# Patient Record
Sex: MALE | Race: WHITE | NOT HISPANIC OR LATINO | Employment: FULL TIME | URBAN - METROPOLITAN AREA
[De-identification: names, ages, dates, MRNs, and addresses within clinical notes are randomized per-mention and may not be internally consistent; named-entity substitution may affect disease eponyms.]

---

## 2023-08-07 PROBLEM — R11.0 MILD NAUSEA: Status: ACTIVE | Noted: 2023-08-07

## 2023-08-07 PROBLEM — E55.9 VITAMIN D DEFICIENCY: Status: ACTIVE | Noted: 2023-08-07

## 2023-08-07 PROBLEM — R50.9 FEVER: Status: RESOLVED | Noted: 2023-08-07 | Resolved: 2023-08-07

## 2023-08-07 PROBLEM — B34.9 VIRAL ILLNESS: Status: ACTIVE | Noted: 2023-08-07

## 2023-08-07 PROBLEM — H61.23 IMPACTED CERUMEN OF BOTH EARS: Status: ACTIVE | Noted: 2023-08-07

## 2023-08-07 PROBLEM — B07.9 VERRUCA: Status: ACTIVE | Noted: 2023-08-07

## 2023-08-07 PROBLEM — R22.0 MASS OF OCCIPITAL REGION: Status: ACTIVE | Noted: 2023-08-07

## 2023-08-07 PROBLEM — H57.10 EYE PAIN: Status: RESOLVED | Noted: 2023-08-07 | Resolved: 2023-08-07

## 2023-08-07 PROBLEM — R21 RASH: Status: ACTIVE | Noted: 2023-08-07

## 2023-08-07 PROBLEM — R51.9 HEADACHE: Status: ACTIVE | Noted: 2023-08-07

## 2023-08-07 PROBLEM — E66.9 OBESITY (BMI 30-39.9): Status: ACTIVE | Noted: 2023-08-07

## 2023-08-08 ENCOUNTER — OFFICE VISIT (OUTPATIENT)
Dept: PRIMARY CARE | Facility: CLINIC | Age: 23
End: 2023-08-08
Payer: COMMERCIAL

## 2023-08-08 VITALS
OXYGEN SATURATION: 97 % | WEIGHT: 315 LBS | SYSTOLIC BLOOD PRESSURE: 120 MMHG | DIASTOLIC BLOOD PRESSURE: 80 MMHG | TEMPERATURE: 98.4 F | BODY MASS INDEX: 40.43 KG/M2 | HEIGHT: 74 IN | HEART RATE: 110 BPM

## 2023-08-08 DIAGNOSIS — Z23 NEED FOR TETANUS BOOSTER: ICD-10-CM

## 2023-08-08 DIAGNOSIS — E66.01 MORBID OBESITY WITH BODY MASS INDEX (BMI) OF 45.0 TO 49.9 IN ADULT (MULTI): ICD-10-CM

## 2023-08-08 DIAGNOSIS — Z00.00 ENCOUNTER FOR ANNUAL PHYSICAL EXAM: Primary | ICD-10-CM

## 2023-08-08 DIAGNOSIS — G43.109 MIGRAINE WITH AURA AND WITHOUT STATUS MIGRAINOSUS, NOT INTRACTABLE: ICD-10-CM

## 2023-08-08 PROCEDURE — 1036F TOBACCO NON-USER: CPT | Performed by: INTERNAL MEDICINE

## 2023-08-08 PROCEDURE — 90471 IMMUNIZATION ADMIN: CPT | Performed by: INTERNAL MEDICINE

## 2023-08-08 PROCEDURE — 99395 PREV VISIT EST AGE 18-39: CPT | Performed by: INTERNAL MEDICINE

## 2023-08-08 PROCEDURE — 90715 TDAP VACCINE 7 YRS/> IM: CPT | Performed by: INTERNAL MEDICINE

## 2023-08-08 PROCEDURE — 3008F BODY MASS INDEX DOCD: CPT | Performed by: INTERNAL MEDICINE

## 2023-08-08 RX ORDER — SUMATRIPTAN 50 MG/1
50 TABLET, FILM COATED ORAL ONCE AS NEEDED
Qty: 27 TABLET | Refills: 3 | Status: SHIPPED | OUTPATIENT
Start: 2023-08-08 | End: 2024-08-07

## 2023-08-08 ASSESSMENT — ENCOUNTER SYMPTOMS
SINUS PAIN: 0
WHEEZING: 0
ARTHRALGIAS: 0
ABDOMINAL DISTENTION: 0
SHORTNESS OF BREATH: 0
NUMBNESS: 0
FATIGUE: 0
NECK STIFFNESS: 0
FLANK PAIN: 0
FEVER: 0
POLYPHAGIA: 0
CONFUSION: 0
MYALGIAS: 0
SINUS PRESSURE: 0
FREQUENCY: 0
DIZZINESS: 0
DYSPHORIC MOOD: 0
ABDOMINAL PAIN: 0
APPETITE CHANGE: 0
DYSURIA: 0
BRUISES/BLEEDS EASILY: 0
POLYDIPSIA: 0
HEADACHES: 1
BLOOD IN STOOL: 0
PHOTOPHOBIA: 0
SPEECH DIFFICULTY: 0
COUGH: 0
RHINORRHEA: 0
TROUBLE SWALLOWING: 0
CHEST TIGHTNESS: 0
HALLUCINATIONS: 0
BACK PAIN: 0
VOMITING: 0
VOICE CHANGE: 0
STRIDOR: 0
COLOR CHANGE: 0
CHOKING: 0
DIFFICULTY URINATING: 0
EYE REDNESS: 0
NERVOUS/ANXIOUS: 0
HEMATURIA: 0
PALPITATIONS: 0
NAUSEA: 0
JOINT SWELLING: 0
DIARRHEA: 0
TREMORS: 0
SEIZURES: 0
FACIAL ASYMMETRY: 0
CONSTIPATION: 0
WEAKNESS: 0
ACTIVITY CHANGE: 0
EYE DISCHARGE: 0

## 2023-08-08 ASSESSMENT — PATIENT HEALTH QUESTIONNAIRE - PHQ9
2. FEELING DOWN, DEPRESSED OR HOPELESS: NOT AT ALL
SUM OF ALL RESPONSES TO PHQ9 QUESTIONS 1 AND 2: 0
1. LITTLE INTEREST OR PLEASURE IN DOING THINGS: NOT AT ALL

## 2023-08-08 ASSESSMENT — PAIN SCALES - GENERAL: PAINLEVEL: 0-NO PAIN

## 2023-08-08 NOTE — LETTER
August 8, 2023     Patient: Gregg Singh   YOB: 2000   Date of Visit: 8/8/2023       To Whom It May Concern:    Gregg Singh was seen in my clinic on 8/8/2023 at 4:00 pm. Please excuse Gregg for his absence from work on this day to make the appointment due to migraines.    If you have any questions or concerns, please don't hesitate to call.         Sincerely,         Ryan Leal MD        CC: No Recipients

## 2023-08-08 NOTE — LETTER
Patient seen in office by Dr. Ryan Leal. Presented for migraines. Please excuse absence on 8/8/23.

## 2023-08-08 NOTE — PROGRESS NOTES
Subjective   Patient ID: Gregg Singh is a 23 y.o. male who presents for New Patient Visit (Establishing care, discuss migraines, requesting note for missing work).    HPI   Patient presented to the office for Migraine headache and Annual Wellness visit. He was seen by Dr Jaime Serna 2 years ago.    Migraine headache is present on and off few days a week and sometimes its so bad that he is not able to go to work. Patient denied for prophylaxis medicine and at this time he just want to continue medicine as needed for acute attack.    Review of Systems   Constitutional:  Negative for activity change, appetite change, fatigue and fever.   HENT:  Negative for congestion, dental problem, ear pain, hearing loss, rhinorrhea, sinus pressure, sinus pain, trouble swallowing and voice change.    Eyes:  Negative for photophobia, discharge, redness and visual disturbance.   Respiratory:  Negative for cough, choking, chest tightness, shortness of breath, wheezing and stridor.    Cardiovascular:  Negative for chest pain, palpitations and leg swelling.   Gastrointestinal:  Negative for abdominal distention, abdominal pain, blood in stool, constipation, diarrhea, nausea and vomiting.   Endocrine: Negative for polydipsia, polyphagia and polyuria.   Genitourinary:  Negative for difficulty urinating, dysuria, flank pain, frequency, hematuria and urgency.   Musculoskeletal:  Negative for arthralgias, back pain, gait problem, joint swelling, myalgias and neck stiffness.   Skin:  Negative for color change and rash.   Allergic/Immunologic: Negative for immunocompromised state.   Neurological:  Positive for headaches. Negative for dizziness, tremors, seizures, syncope, facial asymmetry, speech difficulty, weakness and numbness.   Hematological:  Does not bruise/bleed easily.   Psychiatric/Behavioral:  Negative for behavioral problems, confusion, dysphoric mood, hallucinations and suicidal ideas. The patient is not nervous/anxious.   "    Objective   /80   Pulse 110   Temp 36.9 °C (98.4 °F)   Ht 1.88 m (6' 2\")   Wt (!) 162 kg (357 lb)   SpO2 97%   BMI 45.84 kg/m²     Physical Exam  Constitutional:       General: He is not in acute distress.     Appearance: Normal appearance. He is obese. He is not ill-appearing or toxic-appearing.   HENT:      Head: Normocephalic and atraumatic.      Nose: Nose normal. No congestion or rhinorrhea.      Mouth/Throat:      Mouth: Mucous membranes are moist.   Eyes:      General: No scleral icterus.     Extraocular Movements: Extraocular movements intact.      Conjunctiva/sclera: Conjunctivae normal.      Pupils: Pupils are equal, round, and reactive to light.   Cardiovascular:      Rate and Rhythm: Normal rate and regular rhythm.      Pulses: Normal pulses.      Heart sounds: Normal heart sounds. No murmur heard.     No gallop.   Pulmonary:      Effort: Pulmonary effort is normal. No respiratory distress.      Breath sounds: Normal breath sounds. No stridor. No wheezing, rhonchi or rales.   Abdominal:      General: Abdomen is flat. Bowel sounds are normal.      Palpations: Abdomen is soft.      Tenderness: There is no abdominal tenderness. There is no guarding or rebound.   Musculoskeletal:         General: No swelling or deformity. Normal range of motion.      Cervical back: Normal range of motion and neck supple. No rigidity or tenderness.      Right lower leg: No edema.      Left lower leg: No edema.   Lymphadenopathy:      Cervical: No cervical adenopathy.   Skin:     General: Skin is warm.      Coloration: Skin is not jaundiced.      Findings: No erythema or lesion.   Neurological:      General: No focal deficit present.      Mental Status: He is alert and oriented to person, place, and time.      Cranial Nerves: No cranial nerve deficit.      Motor: No weakness.      Coordination: Coordination normal.      Gait: Gait normal.   Psychiatric:         Mood and Affect: Mood normal.         Behavior: " Behavior normal.         Thought Content: Thought content normal.         Judgment: Judgment normal.       Assessment/Plan   Problem List Items Addressed This Visit          Endocrine/Metabolic    Morbid obesity with body mass index (BMI) of 45.0 to 49.9 in adult (CMS/[facility])     - Lifestyle modification which include daily exercise at least for 45 minute and Low Calorie intake of 1800- 2000 Kcal per day.          Other Visit Diagnoses       Encounter for annual physical exam    -  Primary    Relevant Orders    CBC and Auto Differential    Comprehensive Metabolic Panel    Lipid Panel    PSA, Total and Free    TSH with reflex to Free T4 if abnormal    Vitamin D, Total    Hepatitis C Antibody    HIV 1/2 Antigen/Antibody Screen with Reflex to Confirmation    Migraine with aura and without status migrainosus, not intractable        Relevant Medications    SUMAtriptan (Imitrex) 50 mg tablet    Need for tetanus booster        Relevant Orders    Tdap vaccine, age 10 years and older ([facility]) (Completed)

## 2023-09-06 ENCOUNTER — LAB (OUTPATIENT)
Dept: LAB | Facility: LAB | Age: 23
End: 2023-09-06
Payer: COMMERCIAL

## 2023-09-06 DIAGNOSIS — Z00.00 ENCOUNTER FOR ANNUAL PHYSICAL EXAM: ICD-10-CM

## 2023-09-06 LAB
ALANINE AMINOTRANSFERASE (SGPT) (U/L) IN SER/PLAS: 76 U/L (ref 10–52)
ALBUMIN (G/DL) IN SER/PLAS: 4.6 G/DL (ref 3.4–5)
ALKALINE PHOSPHATASE (U/L) IN SER/PLAS: 79 U/L (ref 33–120)
ANION GAP IN SER/PLAS: 14 MMOL/L (ref 10–20)
ASPARTATE AMINOTRANSFERASE (SGOT) (U/L) IN SER/PLAS: 57 U/L (ref 9–39)
BASOPHILS (10*3/UL) IN BLOOD BY AUTOMATED COUNT: 0.05 X10E9/L (ref 0–0.1)
BASOPHILS/100 LEUKOCYTES IN BLOOD BY AUTOMATED COUNT: 0.7 % (ref 0–2)
BILIRUBIN TOTAL (MG/DL) IN SER/PLAS: 0.9 MG/DL (ref 0–1.2)
CALCIUM (MG/DL) IN SER/PLAS: 9.5 MG/DL (ref 8.6–10.3)
CARBON DIOXIDE, TOTAL (MMOL/L) IN SER/PLAS: 29 MMOL/L (ref 21–32)
CHLORIDE (MMOL/L) IN SER/PLAS: 99 MMOL/L (ref 98–107)
CHOLESTEROL (MG/DL) IN SER/PLAS: 175 MG/DL (ref 0–199)
CHOLESTEROL IN HDL (MG/DL) IN SER/PLAS: 33.7 MG/DL
CHOLESTEROL/HDL RATIO: 5.2
CREATININE (MG/DL) IN SER/PLAS: 1.04 MG/DL (ref 0.5–1.3)
EOSINOPHILS (10*3/UL) IN BLOOD BY AUTOMATED COUNT: 0.04 X10E9/L (ref 0–0.7)
EOSINOPHILS/100 LEUKOCYTES IN BLOOD BY AUTOMATED COUNT: 0.6 % (ref 0–6)
ERYTHROCYTE DISTRIBUTION WIDTH (RATIO) BY AUTOMATED COUNT: 12.4 % (ref 11.5–14.5)
ERYTHROCYTE MEAN CORPUSCULAR HEMOGLOBIN CONCENTRATION (G/DL) BY AUTOMATED: 31.8 G/DL (ref 32–36)
ERYTHROCYTE MEAN CORPUSCULAR VOLUME (FL) BY AUTOMATED COUNT: 93 FL (ref 80–100)
ERYTHROCYTES (10*6/UL) IN BLOOD BY AUTOMATED COUNT: 4.98 X10E12/L (ref 4.5–5.9)
GFR MALE: >90 ML/MIN/1.73M2
GLUCOSE (MG/DL) IN SER/PLAS: 74 MG/DL (ref 74–99)
HEMATOCRIT (%) IN BLOOD BY AUTOMATED COUNT: 46.5 % (ref 41–52)
HEMOGLOBIN (G/DL) IN BLOOD: 14.8 G/DL (ref 13.5–17.5)
IMMATURE GRANULOCYTES/100 LEUKOCYTES IN BLOOD BY AUTOMATED COUNT: 0.4 % (ref 0–0.9)
LDL: 115 MG/DL (ref 0–119)
LEUKOCYTES (10*3/UL) IN BLOOD BY AUTOMATED COUNT: 6.8 X10E9/L (ref 4.4–11.3)
LYMPHOCYTES (10*3/UL) IN BLOOD BY AUTOMATED COUNT: 1.83 X10E9/L (ref 1.2–4.8)
LYMPHOCYTES/100 LEUKOCYTES IN BLOOD BY AUTOMATED COUNT: 26.8 % (ref 13–44)
MONOCYTES (10*3/UL) IN BLOOD BY AUTOMATED COUNT: 0.49 X10E9/L (ref 0.1–1)
MONOCYTES/100 LEUKOCYTES IN BLOOD BY AUTOMATED COUNT: 7.2 % (ref 2–10)
NEUTROPHILS (10*3/UL) IN BLOOD BY AUTOMATED COUNT: 4.39 X10E9/L (ref 1.2–7.7)
NEUTROPHILS/100 LEUKOCYTES IN BLOOD BY AUTOMATED COUNT: 64.3 % (ref 40–80)
PLATELETS (10*3/UL) IN BLOOD AUTOMATED COUNT: 124 X10E9/L (ref 150–450)
POTASSIUM (MMOL/L) IN SER/PLAS: 4 MMOL/L (ref 3.5–5.3)
PROTEIN TOTAL: 7.6 G/DL (ref 6.4–8.2)
SODIUM (MMOL/L) IN SER/PLAS: 138 MMOL/L (ref 136–145)
THYROTROPIN (MIU/L) IN SER/PLAS BY DETECTION LIMIT <= 0.05 MIU/L: 2.47 MIU/L (ref 0.44–3.98)
TRIGLYCERIDE (MG/DL) IN SER/PLAS: 132 MG/DL (ref 0–149)
UREA NITROGEN (MG/DL) IN SER/PLAS: 12 MG/DL (ref 6–23)
VLDL: 26 MG/DL (ref 0–40)

## 2023-09-06 PROCEDURE — 80053 COMPREHEN METABOLIC PANEL: CPT

## 2023-09-06 PROCEDURE — 84443 ASSAY THYROID STIM HORMONE: CPT

## 2023-09-06 PROCEDURE — 36415 COLL VENOUS BLD VENIPUNCTURE: CPT

## 2023-09-06 PROCEDURE — 80061 LIPID PANEL: CPT

## 2023-09-06 PROCEDURE — 85025 COMPLETE CBC W/AUTO DIFF WBC: CPT

## 2023-09-06 PROCEDURE — 82306 VITAMIN D 25 HYDROXY: CPT

## 2023-09-06 PROCEDURE — 87389 HIV-1 AG W/HIV-1&-2 AB AG IA: CPT

## 2023-09-06 PROCEDURE — 84153 ASSAY OF PSA TOTAL: CPT

## 2023-09-06 PROCEDURE — 86803 HEPATITIS C AB TEST: CPT

## 2023-09-06 PROCEDURE — 84154 ASSAY OF PSA FREE: CPT

## 2023-09-07 LAB
CALCIDIOL (25 OH VITAMIN D3) (NG/ML) IN SER/PLAS: 9 NG/ML
HEPATITIS C VIRUS AB PRESENCE IN SERUM: NONREACTIVE
HIV 1/ 2 AG/AB SCREEN: NONREACTIVE

## 2023-09-09 LAB
PROSTATE SPECIFIC AG (NG/ML) IN SER/PLAS: 0.5 NG/ML (ref 0–4)
PROSTATE SPECIFIC AG FREE (NG/ML) IN SER/PLAS: 0.2 NG/ML
PROSTATE SPECIFIC AG FREE/PROSTATE SPECIFIC AG TOTAL IN SER/PLAS: 40 %

## 2023-09-28 ENCOUNTER — OFFICE VISIT (OUTPATIENT)
Dept: PRIMARY CARE | Facility: CLINIC | Age: 23
End: 2023-09-28
Payer: COMMERCIAL

## 2023-09-28 VITALS
OXYGEN SATURATION: 98 % | TEMPERATURE: 97.2 F | DIASTOLIC BLOOD PRESSURE: 80 MMHG | SYSTOLIC BLOOD PRESSURE: 124 MMHG | BODY MASS INDEX: 47.25 KG/M2 | WEIGHT: 315 LBS | HEART RATE: 93 BPM

## 2023-09-28 DIAGNOSIS — E66.01 MORBID OBESITY WITH BODY MASS INDEX (BMI) OF 45.0 TO 49.9 IN ADULT (MULTI): ICD-10-CM

## 2023-09-28 DIAGNOSIS — R79.89 LOW TESTOSTERONE: ICD-10-CM

## 2023-09-28 DIAGNOSIS — E55.9 VITAMIN D DEFICIENCY: Primary | ICD-10-CM

## 2023-09-28 PROBLEM — B34.9 VIRAL ILLNESS: Status: RESOLVED | Noted: 2023-08-07 | Resolved: 2023-09-28

## 2023-09-28 PROCEDURE — 99213 OFFICE O/P EST LOW 20 MIN: CPT | Performed by: INTERNAL MEDICINE

## 2023-09-28 PROCEDURE — 3008F BODY MASS INDEX DOCD: CPT | Performed by: INTERNAL MEDICINE

## 2023-09-28 PROCEDURE — 1036F TOBACCO NON-USER: CPT | Performed by: INTERNAL MEDICINE

## 2023-09-28 RX ORDER — ERGOCALCIFEROL 1.25 MG/1
50000 CAPSULE ORAL
Qty: 12 CAPSULE | Refills: 0 | Status: SHIPPED | OUTPATIENT
Start: 2023-09-28 | End: 2023-11-30 | Stop reason: SDUPTHER

## 2023-09-28 ASSESSMENT — ENCOUNTER SYMPTOMS
DYSURIA: 0
EYE REDNESS: 0
VOICE CHANGE: 0
ACTIVITY CHANGE: 0
CHEST TIGHTNESS: 0
CHOKING: 0
FLANK PAIN: 0
SEIZURES: 0
VOMITING: 0
NAUSEA: 0
SPEECH DIFFICULTY: 0
RHINORRHEA: 0
HALLUCINATIONS: 0
CONSTIPATION: 0
STRIDOR: 0
BACK PAIN: 0
PALPITATIONS: 0
POLYPHAGIA: 0
TROUBLE SWALLOWING: 0
PHOTOPHOBIA: 0
DYSPHORIC MOOD: 0
COLOR CHANGE: 0
NUMBNESS: 0
MYALGIAS: 0
SHORTNESS OF BREATH: 0
FEVER: 0
EYE DISCHARGE: 0
WEAKNESS: 0
CONFUSION: 0
NECK STIFFNESS: 0
POLYDIPSIA: 0
APPETITE CHANGE: 0
SINUS PRESSURE: 0
ABDOMINAL PAIN: 0
COUGH: 0
BLOOD IN STOOL: 0
SINUS PAIN: 0
HEADACHES: 0
FACIAL ASYMMETRY: 0
ARTHRALGIAS: 0
WHEEZING: 0
FATIGUE: 1
DIZZINESS: 0
DIFFICULTY URINATING: 0
ABDOMINAL DISTENTION: 0
FREQUENCY: 0
NERVOUS/ANXIOUS: 0
BRUISES/BLEEDS EASILY: 0
DIARRHEA: 0

## 2023-09-28 ASSESSMENT — PATIENT HEALTH QUESTIONNAIRE - PHQ9
SUM OF ALL RESPONSES TO PHQ9 QUESTIONS 1 AND 2: 0
1. LITTLE INTEREST OR PLEASURE IN DOING THINGS: NOT AT ALL
2. FEELING DOWN, DEPRESSED OR HOPELESS: NOT AT ALL

## 2023-09-28 NOTE — PROGRESS NOTES
Subjective   Patient ID: Gregg Singh is a 23 y.o. male who presents for Fatigue.    HPI   Patient presented to the office for the symptoms of fatigue. His Vitamin D level is 9 and 5 years ago it was 11. Patient is only on MVI but clearly its not working. Vitamin D level need to be replaced.    Patient snore but denied for waking up in night. His STOP BANG score is 5 and is at moderate to high risk for AUDREY.    Review of Systems   Constitutional:  Positive for fatigue. Negative for activity change, appetite change and fever.   HENT:  Negative for congestion, dental problem, ear pain, hearing loss, rhinorrhea, sinus pressure, sinus pain, trouble swallowing and voice change.    Eyes:  Negative for photophobia, discharge, redness and visual disturbance.   Respiratory:  Negative for cough, choking, chest tightness, shortness of breath, wheezing and stridor.    Cardiovascular:  Negative for chest pain, palpitations and leg swelling.   Gastrointestinal:  Negative for abdominal distention, abdominal pain, blood in stool, constipation, diarrhea, nausea and vomiting.   Endocrine: Negative for polydipsia, polyphagia and polyuria.   Genitourinary:  Negative for difficulty urinating, dysuria, flank pain, frequency and urgency.   Musculoskeletal:  Negative for arthralgias, back pain, myalgias and neck stiffness.   Skin:  Negative for color change and rash.   Allergic/Immunologic: Negative for immunocompromised state.   Neurological:  Negative for dizziness, seizures, syncope, facial asymmetry, speech difficulty, weakness, numbness and headaches.   Hematological:  Does not bruise/bleed easily.   Psychiatric/Behavioral:  Negative for behavioral problems, confusion, dysphoric mood, hallucinations and suicidal ideas. The patient is not nervous/anxious.      Objective   /80   Pulse 93   Temp 36.2 °C (97.2 °F)   Wt (!) 167 kg (368 lb)   SpO2 98%   BMI 47.25 kg/m²     Physical Exam  Vitals and nursing note reviewed.    Constitutional:       General: He is not in acute distress.     Appearance: Normal appearance. He is obese. He is not ill-appearing or toxic-appearing.   HENT:      Head: Normocephalic and atraumatic.      Nose: Nose normal. No congestion or rhinorrhea.      Mouth/Throat:      Mouth: Mucous membranes are moist.   Eyes:      General: No scleral icterus.     Extraocular Movements: Extraocular movements intact.      Conjunctiva/sclera: Conjunctivae normal.      Pupils: Pupils are equal, round, and reactive to light.   Cardiovascular:      Rate and Rhythm: Normal rate and regular rhythm.      Pulses: Normal pulses.      Heart sounds: Normal heart sounds. No murmur heard.     No gallop.   Pulmonary:      Effort: Pulmonary effort is normal. No respiratory distress.      Breath sounds: Normal breath sounds. No stridor. No wheezing, rhonchi or rales.   Musculoskeletal:         General: No deformity or signs of injury. Normal range of motion.      Cervical back: Normal range of motion.   Skin:     General: Skin is warm.      Coloration: Skin is not jaundiced.      Findings: No erythema or lesion.   Neurological:      General: No focal deficit present.      Mental Status: He is alert and oriented to person, place, and time.      Cranial Nerves: No cranial nerve deficit.      Motor: No weakness.      Coordination: Coordination normal.      Gait: Gait normal.   Psychiatric:         Mood and Affect: Mood normal.         Behavior: Behavior normal.         Thought Content: Thought content normal.         Judgment: Judgment normal.       Assessment/Plan   Problem List Items Addressed This Visit          Endocrine/Metabolic    Vitamin D deficiency - Primary    Relevant Medications    ergocalciferol (Vitamin D-2) 1.25 MG (29536 UT) capsule    Other Relevant Orders    Vitamin D 25-Hydroxy,Total (for eval of Vitamin D levels)    Morbid obesity with body mass index (BMI) of 45.0 to 49.9 in adult (CMS/[facility])     - Lifestyle modification  which include daily exercise at least for 45 minute and Low Calorie intake of 1800- 2000 Kcal per day.          Other Visit Diagnoses       Low testosterone        Relevant Orders    Testosterone,Free and Total

## 2023-11-29 ENCOUNTER — LAB (OUTPATIENT)
Dept: LAB | Facility: LAB | Age: 23
End: 2023-11-29
Payer: COMMERCIAL

## 2023-11-29 DIAGNOSIS — E55.9 VITAMIN D DEFICIENCY: ICD-10-CM

## 2023-11-29 DIAGNOSIS — R79.89 LOW TESTOSTERONE: ICD-10-CM

## 2023-11-29 PROCEDURE — 82306 VITAMIN D 25 HYDROXY: CPT

## 2023-11-29 PROCEDURE — 36415 COLL VENOUS BLD VENIPUNCTURE: CPT

## 2023-11-29 PROCEDURE — 84402 ASSAY OF FREE TESTOSTERONE: CPT

## 2023-11-30 ENCOUNTER — OFFICE VISIT (OUTPATIENT)
Dept: PRIMARY CARE | Facility: CLINIC | Age: 23
End: 2023-11-30
Payer: COMMERCIAL

## 2023-11-30 VITALS
DIASTOLIC BLOOD PRESSURE: 90 MMHG | BODY MASS INDEX: 39.17 KG/M2 | SYSTOLIC BLOOD PRESSURE: 130 MMHG | HEIGHT: 75 IN | TEMPERATURE: 97.2 F | WEIGHT: 315 LBS | OXYGEN SATURATION: 97 % | HEART RATE: 67 BPM

## 2023-11-30 DIAGNOSIS — E66.01 MORBID OBESITY WITH BODY MASS INDEX (BMI) OF 45.0 TO 49.9 IN ADULT (MULTI): ICD-10-CM

## 2023-11-30 DIAGNOSIS — E55.9 VITAMIN D DEFICIENCY: Primary | ICD-10-CM

## 2023-11-30 PROBLEM — E66.9 OBESITY WITH BODY MASS INDEX 30 OR GREATER: Status: ACTIVE | Noted: 2023-11-30

## 2023-11-30 PROBLEM — B07.9 VIRAL WART, UNSPECIFIED: Status: ACTIVE | Noted: 2018-05-09

## 2023-11-30 PROBLEM — R52 PAIN, UNSPECIFIED: Status: ACTIVE | Noted: 2018-05-09

## 2023-11-30 PROBLEM — L85.8 OTHER SPECIFIED EPIDERMAL THICKENING: Status: ACTIVE | Noted: 2018-05-09

## 2023-11-30 LAB — 25(OH)D3 SERPL-MCNC: 19 NG/ML (ref 30–100)

## 2023-11-30 PROCEDURE — 99213 OFFICE O/P EST LOW 20 MIN: CPT | Performed by: INTERNAL MEDICINE

## 2023-11-30 PROCEDURE — 1036F TOBACCO NON-USER: CPT | Performed by: INTERNAL MEDICINE

## 2023-11-30 PROCEDURE — 3008F BODY MASS INDEX DOCD: CPT | Performed by: INTERNAL MEDICINE

## 2023-11-30 RX ORDER — ERGOCALCIFEROL 1.25 MG/1
50000 CAPSULE ORAL
Qty: 12 CAPSULE | Refills: 3 | Status: SHIPPED | OUTPATIENT
Start: 2023-11-30 | End: 2024-10-31

## 2023-11-30 ASSESSMENT — ENCOUNTER SYMPTOMS
HEMATURIA: 0
DYSPHORIC MOOD: 0
COUGH: 0
HALLUCINATIONS: 0
DYSURIA: 0
CHEST TIGHTNESS: 0
SINUS PAIN: 0
NUMBNESS: 0
VOICE CHANGE: 0
STRIDOR: 0
DECREASED CONCENTRATION: 0
POLYPHAGIA: 0
LIGHT-HEADEDNESS: 0
ARTHRALGIAS: 0
DIFFICULTY URINATING: 0
TREMORS: 0
SPEECH DIFFICULTY: 0
FREQUENCY: 0
TROUBLE SWALLOWING: 0
POLYDIPSIA: 0
FEVER: 0
WEAKNESS: 0
MYALGIAS: 0
HEADACHES: 0
BACK PAIN: 0
SHORTNESS OF BREATH: 0
COLOR CHANGE: 0
BRUISES/BLEEDS EASILY: 0
CONFUSION: 0
CHOKING: 0
NERVOUS/ANXIOUS: 0
EYE DISCHARGE: 0
NAUSEA: 0
WOUND: 0
ACTIVITY CHANGE: 0
PALPITATIONS: 0
NECK STIFFNESS: 0
DIZZINESS: 0
FATIGUE: 0
CONSTIPATION: 0
ABDOMINAL DISTENTION: 0
BLOOD IN STOOL: 0
DIARRHEA: 0
FLANK PAIN: 0
PHOTOPHOBIA: 0
FACIAL ASYMMETRY: 0
RHINORRHEA: 0
SEIZURES: 0
VOMITING: 0
EYE REDNESS: 0
WHEEZING: 0
ABDOMINAL PAIN: 0
SINUS PRESSURE: 0
APPETITE CHANGE: 0

## 2023-11-30 ASSESSMENT — PAIN SCALES - GENERAL: PAINLEVEL: 0-NO PAIN

## 2023-11-30 NOTE — PROGRESS NOTES
"Subjective   Patient ID: Gregg Singh is a 23 y.o. male who presents for Nutrition Counseling (Wanting to get weight down).    HPI   Patient presented to the office for follow up. He is taking Vitamin D level from past 2 months and level of his Vitamin D increased from 9 to 19 but its still low.    He want to see Dietitian for weight loss.    Review of Systems   Constitutional:  Negative for activity change, appetite change, fatigue and fever.   HENT:  Negative for congestion, dental problem, ear pain, hearing loss, rhinorrhea, sinus pressure, sinus pain, trouble swallowing and voice change.    Eyes:  Negative for photophobia, discharge, redness and visual disturbance.   Respiratory:  Negative for cough, choking, chest tightness, shortness of breath, wheezing and stridor.    Cardiovascular:  Negative for chest pain, palpitations and leg swelling.   Gastrointestinal:  Negative for abdominal distention, abdominal pain, blood in stool, constipation, diarrhea, nausea and vomiting.   Endocrine: Negative for polydipsia, polyphagia and polyuria.   Genitourinary:  Negative for difficulty urinating, dysuria, flank pain, frequency, hematuria and urgency.   Musculoskeletal:  Negative for arthralgias, back pain, gait problem, myalgias and neck stiffness.   Skin:  Negative for color change, rash and wound.   Allergic/Immunologic: Negative for immunocompromised state.   Neurological:  Negative for dizziness, tremors, seizures, syncope, facial asymmetry, speech difficulty, weakness, light-headedness, numbness and headaches.   Hematological:  Does not bruise/bleed easily.   Psychiatric/Behavioral:  Negative for behavioral problems, confusion, decreased concentration, dysphoric mood, hallucinations and suicidal ideas. The patient is not nervous/anxious.      Objective   /90   Pulse 67   Temp 36.2 °C (97.2 °F)   Ht 1.905 m (6' 3\")   Wt (!) 165 kg (364 lb)   SpO2 97%   BMI 45.50 kg/m²     Physical Exam  Constitutional:  "      General: He is not in acute distress.     Appearance: Normal appearance. He is obese. He is not ill-appearing or toxic-appearing.   HENT:      Head: Normocephalic and atraumatic.      Nose: Nose normal.      Mouth/Throat:      Mouth: Mucous membranes are moist.   Eyes:      General: No scleral icterus.     Conjunctiva/sclera: Conjunctivae normal.   Cardiovascular:      Rate and Rhythm: Normal rate and regular rhythm.      Pulses: Normal pulses.      Heart sounds: Normal heart sounds. No murmur heard.     No gallop.   Pulmonary:      Effort: Pulmonary effort is normal. No respiratory distress.      Breath sounds: Normal breath sounds. No stridor. No wheezing, rhonchi or rales.   Abdominal:      General: Abdomen is flat. Bowel sounds are normal.      Palpations: Abdomen is soft.      Tenderness: There is no abdominal tenderness. There is no right CVA tenderness or left CVA tenderness.   Musculoskeletal:         General: No swelling or deformity. Normal range of motion.      Cervical back: Normal range of motion.   Skin:     General: Skin is warm.      Coloration: Skin is not jaundiced.      Findings: No erythema or lesion.   Neurological:      General: No focal deficit present.      Mental Status: He is alert and oriented to person, place, and time.      Cranial Nerves: No cranial nerve deficit.      Motor: No weakness.      Coordination: Coordination normal.      Gait: Gait normal.   Psychiatric:         Mood and Affect: Mood normal.         Behavior: Behavior normal.         Thought Content: Thought content normal.         Judgment: Judgment normal.       Assessment/Plan   Problem List Items Addressed This Visit          Endocrine/Metabolic    Vitamin D deficiency - Primary    Relevant Medications    ergocalciferol (Vitamin D-2) 1.25 MG (98245 UT) capsule    Other Relevant Orders    Vitamin D 25-Hydroxy,Total (for eval of Vitamin D levels)    Morbid obesity with body mass index (BMI) of 45.0 to 49.9 in adult  (CMS/[facility])    Relevant Orders    Referral to Nutrition Services

## 2023-12-03 LAB
TESTOSTERONE FREE (CHAN): 45.3 PG/ML (ref 35–155)
TESTOSTERONE,TOTAL,LC-MS/MS: 173 NG/DL (ref 250–1100)

## 2023-12-21 ENCOUNTER — OFFICE VISIT (OUTPATIENT)
Dept: PRIMARY CARE | Facility: CLINIC | Age: 23
End: 2023-12-21
Payer: COMMERCIAL

## 2023-12-21 VITALS
TEMPERATURE: 96.5 F | HEART RATE: 104 BPM | HEIGHT: 75 IN | WEIGHT: 315 LBS | BODY MASS INDEX: 39.17 KG/M2 | OXYGEN SATURATION: 96 % | DIASTOLIC BLOOD PRESSURE: 90 MMHG | SYSTOLIC BLOOD PRESSURE: 130 MMHG

## 2023-12-21 DIAGNOSIS — H04.201 WATERING OF RIGHT EYE: Primary | ICD-10-CM

## 2023-12-21 PROCEDURE — 99213 OFFICE O/P EST LOW 20 MIN: CPT | Performed by: INTERNAL MEDICINE

## 2023-12-21 PROCEDURE — 3008F BODY MASS INDEX DOCD: CPT | Performed by: INTERNAL MEDICINE

## 2023-12-21 PROCEDURE — 1036F TOBACCO NON-USER: CPT | Performed by: INTERNAL MEDICINE

## 2023-12-21 ASSESSMENT — ENCOUNTER SYMPTOMS
POLYPHAGIA: 0
NAUSEA: 0
MYALGIAS: 0
DIZZINESS: 0
VOMITING: 0
FLANK PAIN: 0
COUGH: 0
EYE DISCHARGE: 1
SEIZURES: 0
TROUBLE SWALLOWING: 0
RHINORRHEA: 0
SINUS PRESSURE: 0
ABDOMINAL DISTENTION: 0
BACK PAIN: 0
EYE REDNESS: 0
SPEECH DIFFICULTY: 0
SINUS PAIN: 0
PALPITATIONS: 0
FEVER: 0
DIARRHEA: 0
APPETITE CHANGE: 0
COLOR CHANGE: 0
CONFUSION: 0
DIFFICULTY URINATING: 0
STRIDOR: 0
ACTIVITY CHANGE: 0
FACIAL ASYMMETRY: 0
HEADACHES: 0
WOUND: 0
BLOOD IN STOOL: 0
NECK STIFFNESS: 0
POLYDIPSIA: 0
WHEEZING: 0
TREMORS: 0
DYSPHORIC MOOD: 0
HEMATURIA: 0
CHOKING: 0
BRUISES/BLEEDS EASILY: 0
HALLUCINATIONS: 0
FATIGUE: 0
SLEEP DISTURBANCE: 0
ABDOMINAL PAIN: 0
CHEST TIGHTNESS: 0
WEAKNESS: 0
FREQUENCY: 0
DYSURIA: 0
VOICE CHANGE: 0
ARTHRALGIAS: 0
NERVOUS/ANXIOUS: 0
CONSTIPATION: 0
PHOTOPHOBIA: 0
NUMBNESS: 0
SHORTNESS OF BREATH: 0

## 2023-12-21 ASSESSMENT — PAIN SCALES - GENERAL: PAINLEVEL: 0-NO PAIN

## 2023-12-21 NOTE — ASSESSMENT & PLAN NOTE
No watering of eye at this time.  Physical exam is normal.   I advised him to see an Ophthalmologist as I cannot find any abnormality on inspection of right eye.

## 2023-12-21 NOTE — PROGRESS NOTES
"Subjective   Patient ID: Gregg Singh is a 23 y.o. male who presents for Eye Drainage (Watering of right eye from past several weeks).    HPI   Patient presented to the office for right eye watering discharge on and off from past several weeks. No visual disturbances. No redness. 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    Review of Systems   Constitutional:  Negative for activity change, appetite change, fatigue and fever.   HENT:  Negative for congestion, dental problem, ear pain, hearing loss, rhinorrhea, sinus pressure, sinus pain, trouble swallowing and voice change.    Eyes:  Positive for discharge. Negative for photophobia, redness and visual disturbance.   Respiratory:  Negative for cough, choking, chest tightness, shortness of breath, wheezing and stridor.    Cardiovascular:  Negative for chest pain, palpitations and leg swelling.   Gastrointestinal:  Negative for abdominal distention, abdominal pain, blood in stool, constipation, diarrhea, nausea and vomiting.   Endocrine: Negative for polydipsia, polyphagia and polyuria.   Genitourinary:  Negative for difficulty urinating, dysuria, flank pain, frequency, hematuria and urgency.   Musculoskeletal:  Negative for arthralgias, back pain, gait problem, myalgias and neck stiffness.   Skin:  Negative for color change, rash and wound.   Allergic/Immunologic: Negative for immunocompromised state.   Neurological:  Negative for dizziness, tremors, seizures, syncope, facial asymmetry, speech difficulty, weakness, numbness and headaches.   Hematological:  Does not bruise/bleed easily.   Psychiatric/Behavioral:  Negative for behavioral problems, confusion, dysphoric mood, hallucinations, self-injury, sleep disturbance and suicidal ideas. The patient is not nervous/anxious.      Objective   /90   Pulse 104   Temp 35.8 °C (96.5 °F)   Ht 1.905 m (6' 3\")   Wt (!) 166 kg (365 lb)   SpO2 96%   BMI 45.62 kg/m²     Physical " Exam  Constitutional:       General: He is not in acute distress.     Appearance: He is not ill-appearing or toxic-appearing.   HENT:      Nose: Nose normal. No congestion or rhinorrhea.   Eyes:      General: No scleral icterus.        Right eye: No discharge.         Left eye: No discharge.      Extraocular Movements: Extraocular movements intact.      Conjunctiva/sclera: Conjunctivae normal.      Pupils: Pupils are equal, round, and reactive to light.   Pulmonary:      Effort: Pulmonary effort is normal.   Musculoskeletal:         General: Normal range of motion.      Cervical back: Normal range of motion.   Skin:     General: Skin is warm.   Neurological:      General: No focal deficit present.      Mental Status: He is alert and oriented to person, place, and time.   Psychiatric:         Mood and Affect: Mood normal.         Behavior: Behavior normal.       Assessment/Plan   Problem List Items Addressed This Visit          Eye    Watering of right eye - Primary     No watering of eye at this time.  Physical exam is normal.   I advised him to see an Ophthalmologist as I cannot find any abnormality on inspection of right eye.                No

## 2024-02-05 ENCOUNTER — TELEMEDICINE (OUTPATIENT)
Dept: PRIMARY CARE | Facility: CLINIC | Age: 24
End: 2024-02-05
Payer: COMMERCIAL

## 2024-02-05 ENCOUNTER — LAB REQUISITION (OUTPATIENT)
Dept: LAB | Facility: HOSPITAL | Age: 24
End: 2024-02-05

## 2024-02-05 DIAGNOSIS — Z11.52 ENCOUNTER FOR SCREENING FOR COVID-19: ICD-10-CM

## 2024-02-05 DIAGNOSIS — J06.9 VIRAL URI: Primary | ICD-10-CM

## 2024-02-05 LAB — SARS-COV-2 RNA RESP QL NAA+PROBE: NOT DETECTED

## 2024-02-05 PROCEDURE — 1036F TOBACCO NON-USER: CPT | Performed by: INTERNAL MEDICINE

## 2024-02-05 PROCEDURE — 3008F BODY MASS INDEX DOCD: CPT | Performed by: INTERNAL MEDICINE

## 2024-02-05 PROCEDURE — 99212 OFFICE O/P EST SF 10 MIN: CPT | Performed by: INTERNAL MEDICINE

## 2024-02-05 PROCEDURE — 87635 SARS-COV-2 COVID-19 AMP PRB: CPT

## 2024-02-05 RX ORDER — PREDNISONE 20 MG/1
40 TABLET ORAL DAILY
Qty: 10 TABLET | Refills: 0 | Status: SHIPPED | OUTPATIENT
Start: 2024-02-05 | End: 2024-02-10

## 2024-02-05 ASSESSMENT — ENCOUNTER SYMPTOMS
HEADACHES: 0
DIARRHEA: 0
CHOKING: 0
WHEEZING: 0
EYE DISCHARGE: 0
NUMBNESS: 0
CONSTIPATION: 0
APPETITE CHANGE: 0
POLYDIPSIA: 0
COLOR CHANGE: 0
SEIZURES: 0
VOICE CHANGE: 1
HALLUCINATIONS: 0
SPEECH DIFFICULTY: 0
NECK PAIN: 0
SHORTNESS OF BREATH: 0
SINUS PAIN: 0
NAUSEA: 0
MYALGIAS: 0
DYSPHORIC MOOD: 0
PALPITATIONS: 0
BRUISES/BLEEDS EASILY: 0
VOMITING: 0
FACIAL ASYMMETRY: 0
DYSURIA: 0
WEAKNESS: 0
ABDOMINAL PAIN: 0
NERVOUS/ANXIOUS: 0
FLANK PAIN: 0
BLOOD IN STOOL: 0
CONFUSION: 0
POLYPHAGIA: 0
PHOTOPHOBIA: 0
FREQUENCY: 0
EYE REDNESS: 0
DIFFICULTY URINATING: 0
STRIDOR: 0
SINUS PRESSURE: 1
FEVER: 0
ABDOMINAL DISTENTION: 0
BACK PAIN: 0
CHEST TIGHTNESS: 0
NECK STIFFNESS: 0
ARTHRALGIAS: 0
ACTIVITY CHANGE: 0
COUGH: 1
FATIGUE: 0
DIZZINESS: 0
RHINORRHEA: 1
TROUBLE SWALLOWING: 0

## 2024-02-05 NOTE — PROGRESS NOTES
Telemedicine visit is conducted with the patient with his consent about the medical problem as documented below.   Communication with the patient is face to face over the secured video call.    Subjective   Patient ID: Gregg Singh is a 23 y.o. male who presents for URI.    URI   This is a new problem. The current episode started in the past 7 days. The problem has been gradually worsening. There has been no fever. Associated symptoms include congestion, coughing and rhinorrhea. Pertinent negatives include no abdominal pain, chest pain, diarrhea, dysuria, ear pain, headaches, joint pain, nausea, neck pain, rash, sinus pain, vomiting or wheezing.      Patient exposed to the RSV patient and since then he has congestion/ cough/ runny nose/ hoarseness of voice.    Review of Systems   Constitutional:  Negative for activity change, appetite change, fatigue and fever.   HENT:  Positive for congestion, rhinorrhea, sinus pressure and voice change. Negative for dental problem, ear pain, hearing loss, sinus pain and trouble swallowing.    Eyes:  Negative for photophobia, discharge, redness and visual disturbance.   Respiratory:  Positive for cough. Negative for choking, chest tightness, shortness of breath, wheezing and stridor.    Cardiovascular:  Negative for chest pain, palpitations and leg swelling.   Gastrointestinal:  Negative for abdominal distention, abdominal pain, blood in stool, constipation, diarrhea, nausea and vomiting.   Endocrine: Negative for polydipsia, polyphagia and polyuria.   Genitourinary:  Negative for difficulty urinating, dysuria, flank pain, frequency and urgency.   Musculoskeletal:  Negative for arthralgias, back pain, joint pain, myalgias, neck pain and neck stiffness.   Skin:  Negative for color change and rash.   Allergic/Immunologic: Negative for immunocompromised state.   Neurological:  Negative for dizziness, seizures, syncope, facial asymmetry, speech difficulty, weakness, numbness and  headaches.   Hematological:  Does not bruise/bleed easily.   Psychiatric/Behavioral:  Negative for behavioral problems, confusion, dysphoric mood, hallucinations and suicidal ideas. The patient is not nervous/anxious.      Objective   There were no vitals taken for this visit.    Physical Exam  Virtual Physical Exam  Awake alert and oriented x 3. Following directions and replying to all questions appropriately. VOICE IS HOARSE AND NASALLY CONGESTED. COUGHING INTERMITTENTLY.  No use of accessory muscle of respiration. No acute respiratory distress.  Moving all extremities.  Clear bilateral conjunctiva.  No visible skin rash over the face and extremities.  Joints movements of UE and bilateral knee is normal.  Normal mood with appropriate effect and emotions.    Rest of the physical exam is limited due to virtual nature of this visit.    Assessment/Plan   Problem List Items Addressed This Visit    None  Visit Diagnoses       Viral URI    -  Primary    Relevant Medications    predniSONE (Deltasone) 20 mg tablet

## 2024-02-05 NOTE — LETTER
02/05/24   Gregg Singh  YOB: 2000    To Whom It May Concern:    Gregg Singh was seen in my clinic on 2/5/2024 at 2:45 pm. Please excuse Gregg for his absence from work on this day to make the appointment and also until 2/7/2024 to recover from medical problem.    If you have any questions or concerns, please don't hesitate to call.    According to the [employer] guidelines, endocarditis prophylaxis at times of increased risks is not indicated.          Sincerely,         Ryan Leal MD        CC: No Recipients

## 2024-03-05 ENCOUNTER — LAB (OUTPATIENT)
Dept: LAB | Facility: LAB | Age: 24
End: 2024-03-05
Payer: COMMERCIAL

## 2024-03-05 DIAGNOSIS — E55.9 VITAMIN D DEFICIENCY: ICD-10-CM

## 2024-03-05 PROCEDURE — 36415 COLL VENOUS BLD VENIPUNCTURE: CPT

## 2024-03-05 PROCEDURE — 82306 VITAMIN D 25 HYDROXY: CPT

## 2024-03-06 ENCOUNTER — OFFICE VISIT (OUTPATIENT)
Dept: PRIMARY CARE | Facility: CLINIC | Age: 24
End: 2024-03-06
Payer: COMMERCIAL

## 2024-03-06 VITALS
BODY MASS INDEX: 40.43 KG/M2 | WEIGHT: 315 LBS | HEART RATE: 80 BPM | HEIGHT: 74 IN | TEMPERATURE: 97 F | SYSTOLIC BLOOD PRESSURE: 130 MMHG | OXYGEN SATURATION: 99 % | DIASTOLIC BLOOD PRESSURE: 86 MMHG

## 2024-03-06 DIAGNOSIS — H61.23 BILATERAL IMPACTED CERUMEN: ICD-10-CM

## 2024-03-06 DIAGNOSIS — G43.109 MIGRAINE WITH AURA AND WITHOUT STATUS MIGRAINOSUS, NOT INTRACTABLE: Primary | ICD-10-CM

## 2024-03-06 DIAGNOSIS — E55.9 VITAMIN D DEFICIENCY: ICD-10-CM

## 2024-03-06 PROBLEM — R79.89 DECREASED TESTOSTERONE LEVEL: Status: ACTIVE | Noted: 2023-11-29

## 2024-03-06 PROBLEM — R11.0 NAUSEA: Status: RESOLVED | Noted: 2023-08-07 | Resolved: 2024-03-06

## 2024-03-06 PROBLEM — H04.209 EPIPHORA: Status: ACTIVE | Noted: 2023-12-21

## 2024-03-06 PROBLEM — Z86.59 HISTORY OF DEPRESSION: Status: ACTIVE | Noted: 2024-03-06

## 2024-03-06 PROBLEM — J38.3 VOCAL CORD DYSFUNCTION: Status: ACTIVE | Noted: 2024-03-06

## 2024-03-06 PROBLEM — J06.9 VIRAL UPPER RESPIRATORY TRACT INFECTION: Status: ACTIVE | Noted: 2024-03-06

## 2024-03-06 PROBLEM — R11.0 NAUSEA: Status: ACTIVE | Noted: 2023-08-07

## 2024-03-06 PROBLEM — R52 PAIN: Status: ACTIVE | Noted: 2018-05-09

## 2024-03-06 PROBLEM — L98.9 DISORDER OF SKIN: Status: ACTIVE | Noted: 2018-05-09

## 2024-03-06 PROBLEM — Z86.69 HISTORY OF MIGRAINE: Status: ACTIVE | Noted: 2024-03-06

## 2024-03-06 PROBLEM — R11.0 MILD NAUSEA: Status: RESOLVED | Noted: 2023-08-07 | Resolved: 2024-03-06

## 2024-03-06 PROBLEM — R22.0 MASS OF HEAD: Status: ACTIVE | Noted: 2023-08-07

## 2024-03-06 LAB — 25(OH)D3 SERPL-MCNC: 9 NG/ML (ref 30–100)

## 2024-03-06 PROCEDURE — 99214 OFFICE O/P EST MOD 30 MIN: CPT | Performed by: INTERNAL MEDICINE

## 2024-03-06 PROCEDURE — 3008F BODY MASS INDEX DOCD: CPT | Performed by: INTERNAL MEDICINE

## 2024-03-06 PROCEDURE — 1036F TOBACCO NON-USER: CPT | Performed by: INTERNAL MEDICINE

## 2024-03-06 ASSESSMENT — ENCOUNTER SYMPTOMS
HALLUCINATIONS: 0
WOUND: 0
FEVER: 0
RHINORRHEA: 0
POLYDIPSIA: 0
SPEECH DIFFICULTY: 0
BRUISES/BLEEDS EASILY: 0
MYALGIAS: 0
PALPITATIONS: 0
COUGH: 0
JOINT SWELLING: 0
BACK PAIN: 0
CHOKING: 0
DYSPHORIC MOOD: 0
STRIDOR: 0
CHEST TIGHTNESS: 0
COLOR CHANGE: 0
DIZZINESS: 0
NAUSEA: 0
EYE REDNESS: 0
DIFFICULTY URINATING: 0
LIGHT-HEADEDNESS: 0
FLANK PAIN: 0
VOICE CHANGE: 0
SLEEP DISTURBANCE: 0
NERVOUS/ANXIOUS: 0
CONSTIPATION: 0
EYE DISCHARGE: 0
HEADACHES: 0
SHORTNESS OF BREATH: 0
APPETITE CHANGE: 0
SINUS PRESSURE: 0
NECK STIFFNESS: 0
TROUBLE SWALLOWING: 0
SINUS PAIN: 0
DIARRHEA: 0
FATIGUE: 0
WHEEZING: 0
ABDOMINAL PAIN: 0
ACTIVITY CHANGE: 0
FACIAL ASYMMETRY: 0
FREQUENCY: 0
VOMITING: 0
ABDOMINAL DISTENTION: 0
PHOTOPHOBIA: 0
ARTHRALGIAS: 0
HEMATURIA: 0
BLOOD IN STOOL: 0
SEIZURES: 0
TREMORS: 0
NUMBNESS: 0
CONFUSION: 0
DECREASED CONCENTRATION: 0
WEAKNESS: 0
DYSURIA: 0
POLYPHAGIA: 0

## 2024-03-06 NOTE — PROGRESS NOTES
Subjective   Patient ID: Gregg Singh is a 23 y.o. male who presents for Follow-up.    HPI   Patient said that he is taking Vitamin D regularly but his Vitamin D level is 9 and even from past several years Vitamin D level was low.    He also complaint of fullness of both ears along with muffling sounds. He denied for ear discharge.    His migraine symptoms are under control with Sumatriptan with occasional flaure up.    Review of Systems   Constitutional:  Negative for activity change, appetite change, fatigue and fever.   HENT:  Negative for congestion, dental problem, ear pain, hearing loss, rhinorrhea, sinus pressure, sinus pain, trouble swallowing and voice change.    Eyes:  Negative for photophobia, discharge, redness and visual disturbance.   Respiratory:  Negative for cough, choking, chest tightness, shortness of breath, wheezing and stridor.    Cardiovascular:  Negative for chest pain, palpitations and leg swelling.   Gastrointestinal:  Negative for abdominal distention, abdominal pain, blood in stool, constipation, diarrhea, nausea and vomiting.   Endocrine: Negative for polydipsia, polyphagia and polyuria.   Genitourinary:  Negative for difficulty urinating, dysuria, flank pain, frequency, hematuria and urgency.   Musculoskeletal:  Negative for arthralgias, back pain, gait problem, joint swelling, myalgias and neck stiffness.   Skin:  Negative for color change, rash and wound.   Allergic/Immunologic: Negative for immunocompromised state.   Neurological:  Negative for dizziness, tremors, seizures, syncope, facial asymmetry, speech difficulty, weakness, light-headedness, numbness and headaches.   Hematological:  Does not bruise/bleed easily.   Psychiatric/Behavioral:  Negative for behavioral problems, confusion, decreased concentration, dysphoric mood, hallucinations, self-injury, sleep disturbance and suicidal ideas. The patient is not nervous/anxious.      Objective   /86   Pulse 80   Temp 36.1  "°C (97 °F)   Ht 1.88 m (6' 2\")   Wt (!) 164 kg (362 lb)   SpO2 99%   BMI 46.48 kg/m²     Physical Exam  Vitals and nursing note reviewed.   Constitutional:       General: He is not in acute distress.     Appearance: He is obese. He is not ill-appearing or toxic-appearing.   HENT:      Head: Normocephalic and atraumatic.      Right Ear: Ear canal normal. There is impacted cerumen.      Left Ear: Ear canal normal. There is impacted cerumen.      Nose: Nose normal. No congestion or rhinorrhea.      Mouth/Throat:      Mouth: Mucous membranes are moist.   Eyes:      General: No scleral icterus.     Extraocular Movements: Extraocular movements intact.      Conjunctiva/sclera: Conjunctivae normal.      Pupils: Pupils are equal, round, and reactive to light.   Cardiovascular:      Rate and Rhythm: Normal rate and regular rhythm.      Pulses: Normal pulses.      Heart sounds: Normal heart sounds. No murmur heard.     No gallop.   Pulmonary:      Effort: Pulmonary effort is normal. No respiratory distress.      Breath sounds: Normal breath sounds. No stridor. No wheezing, rhonchi or rales.   Abdominal:      General: Abdomen is flat. Bowel sounds are normal.      Palpations: Abdomen is soft.      Tenderness: There is no abdominal tenderness. There is no right CVA tenderness, left CVA tenderness, guarding or rebound.   Musculoskeletal:         General: No swelling or deformity. Normal range of motion.      Cervical back: Normal range of motion and neck supple. No rigidity or tenderness.      Right lower leg: No edema.      Left lower leg: No edema.   Lymphadenopathy:      Cervical: No cervical adenopathy.   Skin:     General: Skin is warm.      Coloration: Skin is not jaundiced.      Findings: No erythema or lesion.   Neurological:      General: No focal deficit present.      Mental Status: He is alert and oriented to person, place, and time.      Cranial Nerves: No cranial nerve deficit.      Motor: No weakness.      " Coordination: Coordination normal.      Gait: Gait normal.   Psychiatric:         Mood and Affect: Mood normal.         Behavior: Behavior normal.         Thought Content: Thought content normal.         Judgment: Judgment normal.       Assessment/Plan   Problem List Items Addressed This Visit          ENT    Bilateral impacted cerumen     Bilateral ear lavage done and significant wax was removed from unilateral ear and he has been advised to use Debrox for next 5- 6 days and then I will see you back in the office for follow up and repeat ear irrigation.            Endocrine/Metabolic    Vitamin D deficiency     Continue Vitamin D supplement for now and I will check if you need alternative form or route of Vitamin D.            Neuro    Migraine with aura - Primary     Continue with Sumatriptan as needed.

## 2024-03-07 NOTE — ASSESSMENT & PLAN NOTE
Continue Vitamin D supplement for now and I will check if you need alternative form or route of Vitamin D.

## 2024-03-07 NOTE — ASSESSMENT & PLAN NOTE
Bilateral ear lavage done and significant wax was removed from unilateral ear and he has been advised to use Debrox for next 5- 6 days and then I will see you back in the office for follow up and repeat ear irrigation.

## 2024-03-15 ENCOUNTER — OFFICE VISIT (OUTPATIENT)
Dept: PRIMARY CARE | Facility: CLINIC | Age: 24
End: 2024-03-15
Payer: COMMERCIAL

## 2024-03-15 DIAGNOSIS — H61.23 BILATERAL IMPACTED CERUMEN: Primary | ICD-10-CM

## 2024-03-15 PROCEDURE — 1036F TOBACCO NON-USER: CPT | Performed by: INTERNAL MEDICINE

## 2024-03-15 PROCEDURE — 3008F BODY MASS INDEX DOCD: CPT | Performed by: INTERNAL MEDICINE

## 2024-03-15 PROCEDURE — 99212 OFFICE O/P EST SF 10 MIN: CPT | Performed by: INTERNAL MEDICINE

## 2024-03-18 ASSESSMENT — ENCOUNTER SYMPTOMS
FREQUENCY: 0
COLOR CHANGE: 0
BLOOD IN STOOL: 0
WEAKNESS: 0
ARTHRALGIAS: 0
APPETITE CHANGE: 0
HALLUCINATIONS: 0
CHEST TIGHTNESS: 0
DYSURIA: 0
SPEECH DIFFICULTY: 0
SHORTNESS OF BREATH: 0
CONSTIPATION: 0
RHINORRHEA: 0
NAUSEA: 0
PHOTOPHOBIA: 0
FATIGUE: 0
ABDOMINAL DISTENTION: 0
MYALGIAS: 0
EYE REDNESS: 0
FEVER: 0
POLYPHAGIA: 0
SINUS PRESSURE: 0
POLYDIPSIA: 0
ACTIVITY CHANGE: 0
SINUS PAIN: 0
NERVOUS/ANXIOUS: 0
ABDOMINAL PAIN: 0
DIZZINESS: 0
COUGH: 0
NUMBNESS: 0
FACIAL ASYMMETRY: 0
PALPITATIONS: 0
TROUBLE SWALLOWING: 0
DIFFICULTY URINATING: 0
EYE DISCHARGE: 0
DIARRHEA: 0
HEADACHES: 0
CHOKING: 0
BACK PAIN: 0
VOMITING: 0
SEIZURES: 0
FLANK PAIN: 0
CONFUSION: 0
NECK STIFFNESS: 0
WHEEZING: 0
BRUISES/BLEEDS EASILY: 0
VOICE CHANGE: 0
DYSPHORIC MOOD: 0
STRIDOR: 0

## 2024-03-18 NOTE — ASSESSMENT & PLAN NOTE
No wax removed from irrigation. Post irrigation otoscopic exam still showing wax but its pushed to the side and TM is visible. I asked patient there is no more intervention needed but if he want than I can send him to ENT.